# Patient Record
Sex: MALE | Race: WHITE | NOT HISPANIC OR LATINO
[De-identification: names, ages, dates, MRNs, and addresses within clinical notes are randomized per-mention and may not be internally consistent; named-entity substitution may affect disease eponyms.]

---

## 2020-09-16 PROBLEM — Z00.00 ENCOUNTER FOR PREVENTIVE HEALTH EXAMINATION: Status: ACTIVE | Noted: 2020-09-16

## 2020-09-17 ENCOUNTER — APPOINTMENT (OUTPATIENT)
Dept: ORTHOPEDIC SURGERY | Facility: CLINIC | Age: 65
End: 2020-09-17
Payer: MEDICARE

## 2020-09-17 VITALS — HEIGHT: 67 IN | BODY MASS INDEX: 30.13 KG/M2 | WEIGHT: 192 LBS

## 2020-09-17 VITALS — SYSTOLIC BLOOD PRESSURE: 150 MMHG | HEART RATE: 75 BPM | DIASTOLIC BLOOD PRESSURE: 82 MMHG

## 2020-09-17 VITALS — TEMPERATURE: 97.4 F

## 2020-09-17 DIAGNOSIS — Z72.89 OTHER PROBLEMS RELATED TO LIFESTYLE: ICD-10-CM

## 2020-09-17 DIAGNOSIS — Z78.9 OTHER SPECIFIED HEALTH STATUS: ICD-10-CM

## 2020-09-17 DIAGNOSIS — Z86.39 PERSONAL HISTORY OF OTHER ENDOCRINE, NUTRITIONAL AND METABOLIC DISEASE: ICD-10-CM

## 2020-09-17 DIAGNOSIS — Z86.79 PERSONAL HISTORY OF OTHER DISEASES OF THE CIRCULATORY SYSTEM: ICD-10-CM

## 2020-09-17 PROCEDURE — 73030 X-RAY EXAM OF SHOULDER: CPT | Mod: RT

## 2020-09-17 PROCEDURE — 99204 OFFICE O/P NEW MOD 45 MIN: CPT | Mod: 25

## 2020-09-17 PROCEDURE — 20611 DRAIN/INJ JOINT/BURSA W/US: CPT | Mod: RT

## 2020-09-18 NOTE — DISCUSSION/SUMMARY
[de-identified] : Patient was seen today for evaluation and management of right shoulder pain.  The patient was diagnosed with rotator cuff calcific tendinitis and after discussing various treatment options including all risks/benefits/alternatives, patient elected to enroll in the Clinical Trial Titled - The Use of Extended Release Triamcinolone in the Treatment of Rotator Cuff Disease\par IND/TAISHA Number:  [id number]\par HRPP/IRB Number:  \par The patient, now referred to as the subject, is being enrolled into the above named study.  The subject meets inclusion criteria and does not meet any of the exclusion criteria.  Informed consent was obtained prior to any study procedures being performed and the subject was given an opportunity to ask questions which were answered completely.  The subject was given a copy of the signed consent form and elected to proceed with the procedure (see procedure note).\par The subject is aware that the only medication they should be taking for pain is OTC Tylenol (acetaminophen) as needed for pain.  They are to abstain from all OTC/Prescription oral NSAIDs (Advil, Motrin, ibuprofen, Aleve, etc.) and if they do take oral NSAIDs it would be considered a study deviation and they should notify me immediately.  \par The subject is aware that they will be receiving electronic surveys at 2 weeks and 8 weeks after today's visit and they are required to fill out these surveys to the best of their ability.  They are also aware they are to have follow-up office visits at 4 weeks (+/- 7 days), 12 weeks (+/- 7 days), and 24 weeks (+/- 7 days) after today's visit.\par Patient will be started on a course of physical therapy to restore normal range of motion and strength as tolerated.\par Patient appreciates and agrees with current plan.\par \par This note was generated using dragon medical dictation software.  A reasonable effort has been made for proofreading its contents, but typos may still remain.  If there are any questions or points of clarification needed please notify my office.\par

## 2020-09-18 NOTE — PHYSICAL EXAM
[de-identified] : Constitutional: Well-nourished, well-developed, No acute distress\par Respiratory:  Good respiratory effort, no SOB\par Lymphatic: No regional lymphadenopathy, no lymphedema\par Psychiatric: Pleasant and normal affect, alert and oriented x3\par Skin: Clean dry and intact B/L UE/LE\par Musculoskeletal: normal except where as noted in regional exam\par \par \par Left Shoulder:\par APPEARANCE: no marked deformities, no swelling or malalignment\par POSITIVE TENDERNESS: none\par NONTENDER: supraspinatus, infraspinatus, teres minor, LH biceps, anterior and posterior capsule, AC joint\par ROM: full & painless, no scapular winging or dyskinesia present\par RESISTIVE TESTING: painless 5/5 resisted flex/ext, empty can/ER/IR, horizontal abd/add \par SPECIAL TESTS: neg Drop Arm, neg Empty Can, neg Al/Neers, neg Anthony's, neg Speeds, neg Apprehension, neg cross arm adduction, neg apley's scratch test\par Vasc: 2+ radial pulse\par Neuro: AIN, PIN, Ulnar nerve intact to motor, DTRs 2+/4 biceps, triceps, brachioradialis\par Sensation: Intact to light touch throughout\par B/L Elbows:  No asymmetry, malalignment, or swelling, Full ROM, 5/5 strength in flexion/ext, pronation/supination, Joints stable\par B/L Wrist and Hand:  No asymmetry, malalignment, or swelling, Full ROM, 5/5 strength in wrist and long finger flexion/ext, radial/ulnar deviation, Joints stable\par \par Right Shoulder:\par APPEARANCE: no marked deformities, no swelling or malalignment\par POSITIVE TENDERNESS: supraspinatus, long head biceps tendon\par NONTENDER:  infraspinatus, teres minor. biceps. anterior and posterior capsule. AC joint. \par ROM: full PROM, Active ROM flexion limited to 90 deg, abduction limited to 90 deg, moderate painful arc past 60 degrees, no scapular winging or dyskinesia present\par RESISTIVE TESTING: MMT 4+/5 ER, Flexion and Empty can, 5/5 IR. painless 5/5 resisted ext, horizontal abd/add \par SPECIAL TESTS: + Al and Neers, mildly + cross arm adduction, + Speeds, neg Anthony's, neg Drop Arm, neg Apprehension. neg apley's scratch test\par Vasc: 2+ radial pulse\par Neuro: AIN, PIN, Ulnar nerve intact to motor, DTRs 2+/4 biceps, triceps, brachioradialis\par Sensation: Intact to light touch throughout\par  [de-identified] : The following radiographs were ordered and read by me during this patient's visit. I reviewed each radiograph in detail with the patient and discussed the findings as highlighted below. \par \par 3 views of the right shoulder were obtained today that show degenerative changes and evidence of mild GH osteoarthritis with significant large calcific deposit seen overlying the superolateral humeral head.  No fracture, or dislocation seen at this time. There is no malalignment. No other obvious osseous abnormality. Otherwise unremarkable.\par \par

## 2020-09-18 NOTE — PROCEDURE
[de-identified] : Ultrasound Guided Injection \par Indication for ultrasound guidance: Ensure placement within the pathological portion of the rotator cuff/subacromial impingement\par \par Utlizing the [employer] Edge portable ultrasound machine, the Linear 6cm 13-6 MHz transducer, sterile probe cover and sterile ultrasound gel, ultrasound guidance with the probe in the transverse axis overlying the superolateral humeral head, utilizing an in plane approach, was used for the following injection:\par \par Injection: Right shoulder Subacromial Space.\par Indication: Impingement rotator cuff /calcific tendinitis \par \par A discussion was had with the patient regarding this procedure and all questions were answered. All risks, benefits and alternatives were discussed. These included but were not limited to bleeding, infection, and allergic reaction.  A timeout was done to ensure correct side and pt agreed to the procedure.   Alcohol was used to clean the skin, and betadine was used to sterilize and prep the area in the posterior aspect of the shoulder. Ethyl chloride spray was then used as a topical anesthetic.  Following vendor guidelines and sterile technique 5cc of Zilretta injectable solution was prepared in a 10cc syringe.  A 22-gauge needle was used to inject 5cc of Zilretta solution and an image confirming location of injection was saved. A sterile bandage was then applied. The patient tolerated the procedure well.\par \par Internal Study Number: [id number]\par Kit No: [id number]\par Exp Date: Nov 2020\par \par \par

## 2020-09-18 NOTE — HISTORY OF PRESENT ILLNESS
[de-identified] : RHD Patient is here for right shoulder pain that began about 2 weeks ago without inciting injury. He had a prior injury 5-6 years ago that was treated with PT. the pain has progressively worsened. He has increased pain when lifting his arm above his head. He has some pain that radiates to his elbow. He is experiencing N/T into his 4th and 5th digits when he leans on his elbow. He takes Advil and uses a heating pad for pain relief. He is not currently working or exercising. Denies prior injury. \par \par The patient's past medical history, past surgical history, medications and allergies were reviewed by me today and documented accordingly. In addition, the patient's family and social history, which were noncontributory to this visit, were reviewed also. Intake form was reviewed. The patient has no family history of arthritis.

## 2020-10-01 ENCOUNTER — TRANSCRIPTION ENCOUNTER (OUTPATIENT)
Age: 65
End: 2020-10-01

## 2020-10-15 ENCOUNTER — APPOINTMENT (OUTPATIENT)
Dept: ORTHOPEDIC SURGERY | Facility: CLINIC | Age: 65
End: 2020-10-15
Payer: SUBSIDIZED

## 2020-10-15 VITALS — TEMPERATURE: 97.2 F

## 2020-10-15 PROCEDURE — ZZZZZ: CPT

## 2020-10-16 NOTE — DISCUSSION/SUMMARY
[de-identified] : Patient was seen today for continued monitoring within the clinical Trial Titled - The Use of Extended Release Triamcinolone in the Treatment of Rotator Cuff Disease.  Patient has had no adverse events thus far, he has significant reduction in pain, we will continue through the study with monitoring visits as previously scheduled.  Patient appreciates and agrees with current plan.\par \par This note was generated using dragon medical dictation software.  A reasonable effort has been made for proofreading its contents, but typos may still remain.  If there are any questions or points of clarification needed please notify my office.

## 2020-10-16 NOTE — PHYSICAL EXAM
[de-identified] : Constitutional: Well-nourished, well-developed, No acute distress\par Respiratory:  Good respiratory effort, no SOB\par Lymphatic: No regional lymphadenopathy, no lymphedema\par Psychiatric: Pleasant and normal affect, alert and oriented x3\par Skin: Clean dry and intact B/L UE/LE\par Musculoskeletal: normal except where as noted in regional exam\par \par Right shoulder:\par APPEARANCE: no marked deformities, no swelling or malalignment\par POSITIVE TENDERNESS: none\par NONTENDER: supraspinatus, infraspinatus, teres minor, LH biceps, anterior and posterior capsule, AC joint \par ROM: full & painless, no scapular winging or dyskinesia present\par RESISTIVE TESTING: painless 5/5 resisted flex/ext, empty can/ER/IR, horizontal abd/add \par SPECIAL TESTS: neg Drop Arm, neg Empty Can, neg Al/Neers, neg Anthony's, neg Speeds, neg Apprehension, neg cross arm adduction, neg apley's scratch test\par Vasc: 2+ radial pulse\par Neuro: AIN, PIN, Ulnar nerve intact to motor\par Sensation: Intact to light touch throughout\par

## 2020-10-16 NOTE — HISTORY OF PRESENT ILLNESS
[de-identified] : Patient is here for right shoulder injection study follow up.  Patient has had significant relief of his shoulder pain from the cortisone injection provided, he states he is pain-free at this time.  He is undergoing his course of physical therapy as recommended and is performing ADLs and other activities without limitation.

## 2020-12-31 ENCOUNTER — APPOINTMENT (OUTPATIENT)
Dept: ORTHOPEDIC SURGERY | Facility: CLINIC | Age: 65
End: 2020-12-31
Payer: SUBSIDIZED

## 2020-12-31 VITALS — TEMPERATURE: 96.4 F

## 2020-12-31 VITALS — HEIGHT: 67 IN | BODY MASS INDEX: 30.13 KG/M2 | WEIGHT: 192 LBS

## 2020-12-31 PROCEDURE — 99024 POSTOP FOLLOW-UP VISIT: CPT

## 2021-01-02 NOTE — PHYSICAL EXAM
[de-identified] : Constitutional: Well-nourished, well-developed, No acute distress\par Respiratory:  Good respiratory effort, no SOB\par Lymphatic: No regional lymphadenopathy, no lymphedema\par Psychiatric: Pleasant and normal affect, alert and oriented x3\par Skin: Clean dry and intact B/L UE/LE\par Musculoskeletal: normal except where as noted in regional exam\par \par Right shoulder:\par APPEARANCE: no marked deformities, no swelling or malalignment\par POSITIVE TENDERNESS: none\par NONTENDER: supraspinatus, infraspinatus, teres minor, LH biceps, anterior and posterior capsule, AC joint \par ROM: full & painless, no scapular winging or dyskinesia present\par RESISTIVE TESTING: painless 5/5 resisted flex/ext, empty can/ER/IR, horizontal abd/add \par SPECIAL TESTS: neg Drop Arm, neg Empty Can, neg Al/Neers, neg Anthony's, neg Speeds, neg Apprehension, neg cross arm adduction, neg apley's scratch test\par Vasc: 2+ radial pulse\par Neuro: AIN, PIN, Ulnar nerve intact to motor\par Sensation: Intact to light touch throughout\par

## 2021-01-02 NOTE — DISCUSSION/SUMMARY
[de-identified] : Patient was seen today for continued monitoring within the clinical Trial Titled - The Use of Extended Release Triamcinolone in the Treatment of Rotator Cuff Disease.  Patient has had no adverse events thus far, he has significant reduction in pain, we will continue through the study with monitoring visits as previously scheduled.  Patient appreciates and agrees with current plan.\par \par This note was generated using dragon medical dictation software.  A reasonable effort has been made for proofreading its contents, but typos may still remain.  If there are any questions or points of clarification needed please notify my office.

## 2021-01-02 NOTE — HISTORY OF PRESENT ILLNESS
[de-identified] : Patient is seen today for scheduled follow-up within the clinical trial.  Patient feels like he has significant improvement in his pain, he had an episode 2 nights ago where he had a few minutes of pain after leaning against a sofa, but that went away after a few minutes.  He has been having no pain since that time.  No pain today.ls completely normal.

## 2021-04-08 ENCOUNTER — APPOINTMENT (OUTPATIENT)
Dept: ORTHOPEDIC SURGERY | Facility: CLINIC | Age: 66
End: 2021-04-08
Payer: MEDICARE

## 2021-04-08 VITALS
DIASTOLIC BLOOD PRESSURE: 74 MMHG | HEART RATE: 87 BPM | WEIGHT: 192 LBS | SYSTOLIC BLOOD PRESSURE: 145 MMHG | HEIGHT: 67 IN | BODY MASS INDEX: 30.13 KG/M2

## 2021-04-08 PROCEDURE — 20610 DRAIN/INJ JOINT/BURSA W/O US: CPT | Mod: RT

## 2021-04-08 PROCEDURE — 99213 OFFICE O/P EST LOW 20 MIN: CPT | Mod: 25

## 2021-04-08 PROCEDURE — 99072 ADDL SUPL MATRL&STAF TM PHE: CPT

## 2021-04-08 NOTE — PHYSICAL EXAM
[de-identified] : Constitutional: Well-nourished, well-developed, No acute distress\par Respiratory:  Good respiratory effort, no SOB\par Lymphatic: No regional lymphadenopathy, no lymphedema\par Psychiatric: Pleasant and normal affect, alert and oriented x3\par Skin: Clean dry and intact B/L UE/LE\par Musculoskeletal: normal except where as noted in regional exam\par \par Right shoulder:\par APPEARANCE: no marked deformities, no swelling or malalignment\par POSITIVE TENDERNESS: supraspinatus\par NONTENDER:  infraspinatus, teres minor. biceps. anterior and posterior capsule. AC joint. \par ROM: full with mild painful arc past 60 degrees, no scapular winging or dyskinesia present\par RESISTIVE TESTING: MMT 4+/5 ER and empty can, 5/5 IR. painless 5/5 resisted flex/ext, horizontal abd/add \par SPECIAL TESTS: + Al and Neers, mildly + cross arm adduction, neg Speeds, neg Anthony's, neg Drop Arm, neg Apprehension. neg apley's scratch test\par

## 2021-04-08 NOTE — PROCEDURE
[de-identified] : Injection: Right  Shoulder Subacromial Space.\par Indication: Impingement.\par \par A discussion was had with the patient regarding this procedure and all questions were answered. All risks, benefits and alternatives were discussed. These included but were not limited to bleeding, infection, and allergic reaction.  A timeout was done to ensure correct side and pt agreed to the procedure.   Alcohol was used to clean the skin, and betadine was used to sterilize and prep the area in the posterior aspect of the shoulder. Ethyl chloride spray was then used as a topical anesthetic. A 22-gauge 1.5" needle was used to inject 2cc of 0.25% bupivacaine and 1cc of 40mg/ml methylprednisolone into the subacromial space. A sterile bandage was then applied. The patient tolerated the procedure well and there were no complications.

## 2021-04-08 NOTE — DISCUSSION/SUMMARY
[de-identified] : Patient was seen today for reassessment of right shoulder pain secondary to exacerbation of underlying calcific tendinitis.  Patient had very good long-lasting relief from long-acting triamcinolone injection that he received during the FDA trial.  Today is the last scheduled visit of that trial, and patient has had recurrence of pain without recent trauma over the last 1 month.  I advised the patient this is a good response to cortisone injection.  We discussed various treatment options as well as associated risk/benefits/alternatives and patient elected to proceed with repeat subacromial cortisone injection (Depo-Medrol) today (see procedure note).  Informed the patient that the numbing medicine in today's injection will last for about 4-6 hours. The steroid that was injected will start to work in 1 to 2 days, peak at 1-2 weeks, and may last up to 1-2 months.  Patient will be restarted on his course of physical therapy, and he may resume other ADLs and other activities as tolerated.  If patient has short lasting relief from this cortisone injection, or he has recurrence of pain again may consider MRI at that time for further evaluation of the integrity of the rotator cuff.  Follow up as needed.  Patient appreciates and agrees with current plan.\par \par This note was generated using dragon medical dictation software.  A reasonable effort has been made for proofreading its contents, but typos may still remain.  If there are any questions or points of clarification needed please notify my office.\par

## 2021-05-06 ENCOUNTER — APPOINTMENT (OUTPATIENT)
Dept: ORTHOPEDIC SURGERY | Facility: CLINIC | Age: 66
End: 2021-05-06
Payer: MEDICARE

## 2021-05-06 PROCEDURE — 99072 ADDL SUPL MATRL&STAF TM PHE: CPT

## 2021-05-06 PROCEDURE — 99213 OFFICE O/P EST LOW 20 MIN: CPT

## 2021-05-06 NOTE — DISCUSSION/SUMMARY
[de-identified] : Patient was seen today for reevaluation of chronic right shoulder pain secondary to calcific rotator cuff pathology.  Patient had good relief from first cortisone injection, however he has had less relief from recent cortisone injection.  He is having actual worsening of his pain with more frequent daily pain and more pain with overhead activity.  Due to persistence/worsening of rotator cuff pain recommend MRI of the right shoulder to evaluate for extent of rotator cuff pathology.  Patient is advised we can review MRI results over the phone once available.  If he has significant rotator cuff tear findings would recommend surgical consultation at that time.  Patient appreciates and agrees with current plan.\par \par This note was generated using dragon medical dictation software.  A reasonable effort has been made for proofreading its contents, but typos may still remain.  If there are any questions or points of clarification needed please notify my office.

## 2021-05-06 NOTE — PHYSICAL EXAM
[de-identified] : Constitutional: Well-nourished, well-developed, No acute distress\par Respiratory:  Good respiratory effort, no SOB\par Lymphatic: No regional lymphadenopathy, no lymphedema\par Psychiatric: Pleasant and normal affect, alert and oriented x3\par Musculoskeletal: normal except where as noted in regional exam\par \par Right shoulder:\par APPEARANCE: no marked deformities, no swelling or malalignment\par POSITIVE TENDERNESS: supraspinatus\par NONTENDER:  infraspinatus, teres minor. biceps. anterior and posterior capsule. AC joint. \par ROM: full with moderate painful arc past 60 degrees, no scapular winging or dyskinesia present\par RESISTIVE TESTING: MMT 4+/5 ER and empty can, 5/5 IR. painless 5/5 resisted flex/ext, horizontal abd/add \par SPECIAL TESTS: + Al and Neers, mildly + cross arm adduction, neg Speeds, neg Anthony's, neg Drop Arm, neg Apprehension. neg apley's scratch test\par

## 2021-05-06 NOTE — HISTORY OF PRESENT ILLNESS
[de-identified] : Patient is here for right shoulder pain follow up. He has not had much relief from the last injection and feels that his pain is worsening. There has been no recent injury.

## 2021-06-01 ENCOUNTER — APPOINTMENT (OUTPATIENT)
Dept: ORTHOPEDIC SURGERY | Facility: CLINIC | Age: 66
End: 2021-06-01
Payer: MEDICARE

## 2021-06-01 PROCEDURE — 99212 OFFICE O/P EST SF 10 MIN: CPT | Mod: 95

## 2021-06-01 NOTE — HISTORY OF PRESENT ILLNESS
[Home] : at home, [unfilled] , at the time of the visit. [Medical Office: ([facility])___] : at the medical office located in  [Verbal consent obtained from patient] : the patient, [unfilled] [de-identified] : Patient was seen today via telehealth for review of MRI results.  Patient was able to obtain MRI of his right shoulder and would like to review his results at this time.  Patient has been having persisting pain since last evaluation, he feels his pain is actually worse at this time, he is having pain that wakes him up at night, and pain with any overhead activity.

## 2021-06-01 NOTE — DISCUSSION/SUMMARY
[de-identified] : Patient was seen today for reevaluation of persisting right shoulder pain secondary to underlying calcific tendinitis.  We reviewed his MRI results which shows the presence of the calcium deposit within the rotator cuff tendons, and surrounding rotator cuff tendinosis with mild fraying but no significant high-grade full-thickness tears.  With persisting/worsening shoulder pain due to this underlying pathology recommend surgical consultation.  They can discuss risk/benefits of repeat cortisone injection, versus referral to IR for calcium aspiration, versus arthroscopic rotator cuff debridement.  Patient appreciates and agrees with current plan.\par \par This note was generated using dragon medical dictation software.  A reasonable effort has been made for proofreading its contents, but typos may still remain.  If there are any questions or points of clarification needed please notify my office.

## 2021-06-01 NOTE — PHYSICAL EXAM
[de-identified] : Constitutional: Well-nourished, well-developed, No acute distress\par Respiratory:  Good respiratory effort, no SOB\par Psychiatric: Pleasant and normal affect, alert and oriented x3\par \par Right shoulder:\par APPEARANCE: no marked deformities, no swelling or malalignment\par ROM: full with moderate painful arc past 60 degrees, no scapular winging or dyskinesia present\par  [de-identified] : I reviewed, interpreted and clinically correlated the following outside imaging studies,\par \par MRI right shoulder from [facility] radiology 5/18/2021\par Calcific tendinosis of the supraspinatus tendon, rotator cuff tendinosis with very low-grade tearing of the subscapularis tendon, no high-grade rotator cuff tear is seen, mild subacromial bursitis.

## 2021-06-07 ENCOUNTER — APPOINTMENT (OUTPATIENT)
Dept: ORTHOPEDIC SURGERY | Facility: CLINIC | Age: 66
End: 2021-06-07
Payer: MEDICARE

## 2021-06-07 PROCEDURE — 99072 ADDL SUPL MATRL&STAF TM PHE: CPT

## 2021-06-07 PROCEDURE — 99203 OFFICE O/P NEW LOW 30 MIN: CPT

## 2021-06-08 NOTE — DISCUSSION/SUMMARY
[de-identified] : 67 y/o male with right shoulder rotator cuff tendinopathy\par \par The patient presents today with chronic onset of right shoulder pain consistent with a clinical diagnosis of calcific tendinopathy. Radiographs show a calcification at the rotator cuff insertion and MRI suggests degenerative rotator cuff pathology in the setting of calcific tendinopathy. Clinically, the patient specific point tenderness to this location as well as positive impingement signs. I discussed the pathophysiology of the diagnosis and the distinction between the resorptive and formative phase.\par \par I discussed the treatment of calcific tendinitis with the patient including nonoperative management as first line treatment. Anti-inflammatory medications (NSAID's) with physical therapy for strengthening and conditioning of the joint to preserve shoulder range of motion is typically required. Corticosteroid injection may be indicated for refractory cases and for acute symptomatic pain relief. \par \par Given failure of conservative management, recommendation at this time would be for arthroscopic debridement with possible rotator cuff repair.  All risks, benefits and alternatives to the proposed surgical procedure, as well as the need for formal post-operative rehabilitation were discussed in great detail with the patient. Risks include but are not limited to pain, bleeding, infection, neurovascular injury, stiffness, rotator cuff repair, medical complications (including DVT, PE, MI), and risks of anesthesia. \par \par A discussion was also had with the patient regarding additional risk given recent Covid-19 pandemic; including the potential additional risk of anesthesia and any medical comorbidities that the patient has.  It was made clear to the patient that we are taking all precautions regarding Covid-19 with regards to surgical scheduling and perioperative care.  The patient understands that current protocol requires Covid-19 testing no more than 48hrs prior to surgery, and PST's may be performed onsite at the day of surgery. \par \par The patient expressed understanding and all questions were answered. The patient is electing to proceed, and will have the patient scheduled accordingly.

## 2021-06-08 NOTE — PHYSICAL EXAM
[de-identified] : Oriented to time, place, person\par Mood: Normal\par Affect: Normal\par Appearance: Healthy, well appearing, no acute distress.\par Gait: Normal\par Assistive Devices: None\par \par Right shoulder exam:\par \par Inspection: No malalignment, No defects, No atrophy\par Skin: No masses, No lesions\par Neck: Negative Spurling, full ROM, no pain with ROM\par AROM: FF to 180, abduction to 90, ER to 60, IR to mid thoracic\par Painful arc ROM: Pain with terminal ROM\par Tenderness: No bicipital tenderness, + tenderness to greater tuberosity/RTC insertion, +anterior shoulder/lesser tuberosity tenderness\par Strength: 4+/5 ER, 5/5 IR in adduction, 4/5 supraspinatus testing, negative [place]'s test\par AC joint: No TTP/pain with cross arm testing\par Biceps: Speed Negative, Yergason Negative \par Impingement test: +Al, +Neer\par Vasc: 2+ radial pulse \par Stability: Stable \par Neuro: AIN, PIN, Ulnar nerve intact to motor\par Sensation: Intact to light touch throughout  [de-identified] : Images were reviewed from [facility] dated 9.17.20. \par \par Multiple images right shoulder showed no evidence of bony injury, or lucille dislocation. There is no underlying degenerative arthritic change seen. Overall alignment is maintained.  Evidence of rotator cuff tendinopathy. \par \par MRI right shoulder dated 5/18/2021 shows calcific tendinosis of the supraspinatus tendon. RTC tendinosis with low-grade tearing of the subscapularis tendon.

## 2021-06-08 NOTE — HISTORY OF PRESENT ILLNESS
[de-identified] : 66 year old male presents today for evaluation of right shoulder pain since 9/2020. No injury reported. He has been under the care of Dr. Zimmerman since 9/2020, has received two injections; first injection was 9/2020 of zilretta which gave him relief for 6 months. Second cortisone injection was 4/2021 which was not helpful. He obtained MRI right shoulder from [facility] radiology 5/18/2021 that showed calcific tendinosis of the RTC. The pain is constant ache and gets worse with internal, when bringing his arm down from overhead, rotation and shaking hands. Takes Advil/Ibuprofen for pain.\par \par The patient's past medical history, past surgical history, medications and allergies were reviewed by me today with the patient and documented accordingly. In addition, the patient's family and social history, which were noncontributory to this visit, were reviewed also.

## 2021-06-10 ENCOUNTER — OUTPATIENT (OUTPATIENT)
Dept: OUTPATIENT SERVICES | Facility: HOSPITAL | Age: 66
LOS: 1 days | End: 2021-06-10
Payer: MEDICARE

## 2021-06-10 VITALS
DIASTOLIC BLOOD PRESSURE: 70 MMHG | HEART RATE: 96 BPM | HEIGHT: 60 IN | TEMPERATURE: 98 F | RESPIRATION RATE: 16 BRPM | WEIGHT: 190.92 LBS | OXYGEN SATURATION: 95 % | SYSTOLIC BLOOD PRESSURE: 120 MMHG

## 2021-06-10 DIAGNOSIS — Z98.890 OTHER SPECIFIED POSTPROCEDURAL STATES: Chronic | ICD-10-CM

## 2021-06-10 DIAGNOSIS — M75.111 INCOMPLETE ROTATOR CUFF TEAR OR RUPTURE OF RIGHT SHOULDER, NOT SPECIFIED AS TRAUMATIC: ICD-10-CM

## 2021-06-10 DIAGNOSIS — M75.41 IMPINGEMENT SYNDROME OF RIGHT SHOULDER: ICD-10-CM

## 2021-06-10 DIAGNOSIS — M75.31 CALCIFIC TENDINITIS OF RIGHT SHOULDER: ICD-10-CM

## 2021-06-10 DIAGNOSIS — M75.110 INCOMPLETE ROTATOR CUFF TEAR OR RUPTURE OF UNSPECIFIED SHOULDER, NOT SPECIFIED AS TRAUMATIC: ICD-10-CM

## 2021-06-10 LAB
ANION GAP SERPL CALC-SCNC: 16 MMOL/L — HIGH (ref 7–14)
BUN SERPL-MCNC: 28 MG/DL — HIGH (ref 7–23)
CALCIUM SERPL-MCNC: 9.8 MG/DL — SIGNIFICANT CHANGE UP (ref 8.4–10.5)
CHLORIDE SERPL-SCNC: 103 MMOL/L — SIGNIFICANT CHANGE UP (ref 98–107)
CO2 SERPL-SCNC: 21 MMOL/L — LOW (ref 22–31)
CREAT SERPL-MCNC: 1.39 MG/DL — HIGH (ref 0.5–1.3)
GLUCOSE SERPL-MCNC: 96 MG/DL — SIGNIFICANT CHANGE UP (ref 70–99)
HCT VFR BLD CALC: 49.2 % — SIGNIFICANT CHANGE UP (ref 39–50)
HGB BLD-MCNC: 16.5 G/DL — SIGNIFICANT CHANGE UP (ref 13–17)
MCHC RBC-ENTMCNC: 28.6 PG — SIGNIFICANT CHANGE UP (ref 27–34)
MCHC RBC-ENTMCNC: 33.5 GM/DL — SIGNIFICANT CHANGE UP (ref 32–36)
MCV RBC AUTO: 85.4 FL — SIGNIFICANT CHANGE UP (ref 80–100)
NRBC # BLD: 0 /100 WBCS — SIGNIFICANT CHANGE UP
NRBC # FLD: 0 K/UL — SIGNIFICANT CHANGE UP
PLATELET # BLD AUTO: 174 K/UL — SIGNIFICANT CHANGE UP (ref 150–400)
POTASSIUM SERPL-MCNC: 3.5 MMOL/L — SIGNIFICANT CHANGE UP (ref 3.5–5.3)
POTASSIUM SERPL-SCNC: 3.5 MMOL/L — SIGNIFICANT CHANGE UP (ref 3.5–5.3)
RBC # BLD: 5.76 M/UL — SIGNIFICANT CHANGE UP (ref 4.2–5.8)
RBC # FLD: 13.2 % — SIGNIFICANT CHANGE UP (ref 10.3–14.5)
SODIUM SERPL-SCNC: 140 MMOL/L — SIGNIFICANT CHANGE UP (ref 135–145)
WBC # BLD: 8.01 K/UL — SIGNIFICANT CHANGE UP (ref 3.8–10.5)
WBC # FLD AUTO: 8.01 K/UL — SIGNIFICANT CHANGE UP (ref 3.8–10.5)

## 2021-06-10 PROCEDURE — 93010 ELECTROCARDIOGRAM REPORT: CPT

## 2021-06-10 NOTE — H&P PST ADULT - MUSCULOSKELETAL COMMENTS
right shoulder when lifting arm forward and bringing back down to rest. Pre-op diagnosis incomplete rotator cuff tear

## 2021-06-10 NOTE — H&P PST ADULT - HEMATOLOGY/LYMPHATICS
Keyur Pal is an 60 year old male being seen at the request of Dr. Weeks.  Patient was diagnosed with common variable immunodeficiency in 1981 while giving blood transfusions. He was followed for many years but started IVIG replacement in July/August 2015. This was when he had a cavitating pulmonary nodule found be Klebsiella. In 2012 he had had an MRSA infection. He has been receiving 45 g of IVIG every 28 days. In the past few years his spleen has been progressively enlarging, but seems to of really taken off in the past year. He denies f/c/s. His biggest issues are mass related. He has 4-5 bowel movements a day. He has early satiety and has lost about 4-5 pounds in the last year. He has begun to have left sided back pain/ache.    His CTs have shown the splenomegaly, but no clear adenopathy or other mass lesions.   A BMBx of 4/23/19 was interpreted as follows:  Bone marrow, biopsy core, touch imprint, aspirate and peripheral blood smears    ([id number], 4/23/19) (Consultant's Diagnosis, [facility]):- Cellular    (60%) bone marrow with trilineage hematopoiesis.    - Multiple benign lymphoid aggregates.    - Thrombocytopenia.    - See Comment.        Diagnosis Comment:    This is a case of a 60-year-old male with common variable immuno deficiency    (CVID).  He is being evaluated for massive splenomegaly.  Examination of the    peripheral blood reveals thrombocytopenia.  The bone marrow is mildly    hypercellular for age with trilineage hematopoiesis and maturation.  There are    several atypical, including paratrabecular, lymphoid aggregates present within    the bone marrow which are B-cell rich.  The findings of these atypical    lymphoid aggregates is concerning for a low-grade B-cell lymphoma.  PCR gene    rearrangement studies are currently pending.  Dr. Aurelia Daniel has reviewed    this case and concurs with this assessment.      Past Medical History:   Diagnosis Date   • Common variable  immunodeficiency (CMS/HCC)     a subsection of his white blood cells, worsened with age   • Fracture     nose    • MRSA (methicillin resistant Staphylococcus aureus) 2012   • Pneumonia 2011    has had several due to immunodeficiency   • Wears partial dentures     upper     Past Surgical History:   Procedure Laterality Date   • Bronchoscopy     • Service to gastroenterology      Colonoscopy   • Tonsillectomy and adenoidectomy      age 16     Family History   Problem Relation Age of Onset   • Hypertension Mother    • Melanoma Mother    • Cancer Father 84        prostate   • Dementia/Alzheimers Father    • Melanoma Sister    • Diabetes Maternal Grandfather      Social History     Tobacco Use   • Smoking status: Current Some Day Smoker     Packs/day: 0.25     Years: 30.00     Pack years: 7.50     Types: Cigarettes     Last attempt to quit: 3/17/2017     Years since quittin.2   • Smokeless tobacco: Never Used   • Tobacco comment: Has tried chantix   Substance Use Topics   • Alcohol use: Yes     Alcohol/week: 0.6 oz     Types: 1 Glasses of wine per week       Prior to Admission Meds:  (Not in a hospital admission)  Scheduled Meds:  Continuous Infusions:  PRN Meds:    Allergies: ALLERGIES:  No Known Allergies    Active Problems:    * No active hospital problems. *    There were no vitals taken for this visit.    Review of Systems  Obtained by patient intake form and entered by the medical assistant/physician assistant. (see MA/PA notes dated same day as appointment). I have reviewed the pertinent positives and negatives with the patient and agree with documentation entered.     Physical Exam  Visit Vitals  /66   Pulse 80   Temp 97.5 °F (36.4 °C) (Oral)   Ht 6' (1.829 m)   Wt 77.3 kg   BMI 23.11 kg/m²     No apparent distress.  Pupils equal, round, reactive to light and accommodation, extraocular movements intact.   Neck:  Supple, no lymphadenopathy, no masses or lesions.  Heart:  Regular rate,  rhythm.   Lungs:  Clear to auscultation bilaterally, normal respirations.  Abdomen:  Soft, nontender, nondistended, no masses or lesions.  Very large spleen - crosses midline and enters into lower pelvis  Extremities:  No clubbing, cyanosis, edema.  Neurologic:  Awake, alert.  Lymph Nodes:  No cervical, no axillary, no supraclavicular lymphadenopathy.      Assessment:  Mr. Pal is a pleasant 60-year-old gentleman accompanied by his wife referred to us for history of CV ID. He is being followed by Hematology in view of massive splenomegaly with the question of an underlying contribution of his immunodeficiency to the same.    His hematologist discussed with him possible differential scenario in which a condition such as CVID could be associated with increase in spleen size. This could be part of a 'syndromic' immunodeficiency presentation such as ALPS but would be unlikely since most of these present early on in childhood and his issues started relatively recently.     Blood work was done and did not show lymphoproliferative non-malignant conditions associated with immune deficiency, looking a B cell markers, and cell survival.     Consideration for reactive splenomegaly in association with CVID is likely contributing with the final possibility of a low grade hematological low-grade lymphoproliferative disorder akin to marginal zone lymphoma with severe splenomegaly as noted by Hematology team. Mr. Pal is affected by his large spleen, abdominal pain, dull ache and back pain daily. He is also at additional risk of splenic rupture with trauma, as well as consequences of hypersplenism (he continues to have low platelet counts) and has noticed an minor increase in bleeding tendency but none severe.      Plan:  Immunizations - dead viruses, Dr Weeks agrees that patient should have this done preoperatively.   Plan for hand assisted splenectomy  Risks of surgery including bleeding, infection, post splenectomy sepsis,  conversion to open and pancreatitis were discussed.     In addendum, I received message 6/27:  Patient talked to Dr. Weeks yesterday the immunologist at [facility] called Nestor back and agrees with Dr. Torrez that his spleen should not be removed.   Nestor states he is really confused right now, but he wanted me to let Dr. Weeks know what the immunologist said.     I called patient on 6/28 and discussed above.  As he was uncertain now that he wanted splenectomy.  I said this is a big decision.  He could try the rituxan and if no response, then consider surgery.  We discussed postponing surgery until he further decided which path he wanted.  Patient kept changing his mind in clinic and on the phone.    I felt it was best to postpone surgery until he was 100% sure of surgery given its risks.   He was encouraged to call me if he changes his mind and my clinic could give him immunizations if he wanted surgery.     Icalled the Ivory Loyola MD  6/25/2019   details…

## 2021-06-10 NOTE — H&P PST ADULT - NSANTHOSAYNRD_GEN_A_CORE
No. ARIAN screening performed.  STOP BANG Legend: 0-2 = LOW Risk; 3-4 = INTERMEDIATE Risk; 5-8 = HIGH Risk

## 2021-06-10 NOTE — H&P PST ADULT - ASSESSMENT
65 yo male with history of HTN, HLD presents to PST unit with pre-op diagnosis of calcific tendinitis of right shoulder, impingement syndrome or right shoulder scheduled for right shoulder major debridement, subacromial decompression, acromioplasty, rotator cuff repair with Dr. Burkett

## 2021-06-10 NOTE — H&P PST ADULT - MUSCULOSKELETAL
Right shoulder, pre-op diagnosis incomplete rotator cuff tear/decreased ROM due to pain details… detailed exam

## 2021-06-10 NOTE — H&P PST ADULT - NSICDXPASTMEDICALHX_GEN_ALL_CORE_FT
PAST MEDICAL HISTORY:  Hyperlipidemia     Hypertension     Incomplete rotator cuff tear or rupture of right shoulder, not specified as traumatic

## 2021-06-10 NOTE — H&P PST ADULT - NSICDXPROBLEM_GEN_ALL_CORE_FT
PROBLEM DIAGNOSES  Problem: Incomplete rotator cuff tear or rupture of right shoulder, not specified as traumatic  Assessment and Plan:  scheduled for right shoulder major debridement, subacromial decompression, acromioplasty, rotator cuff repair with Dr. Burkett  on 6/16/2021.  Verbal and written pre-op instructions provided to patient. Patient verbalized understanding and will call surgeons office for revised instructions if surgery is rescheduled.   Pepcid for GI prophylaxis provided.   Patient given verbal and written instruction with teach back on chlorhexidine shampoo, and the patient verbalized understanding with return demonstration.   Covid 19 vaccination card in chart   Patient will obtain medical clearance as per surgeons request-copy requested by PST NP for dvanced age.

## 2021-06-15 ENCOUNTER — TRANSCRIPTION ENCOUNTER (OUTPATIENT)
Age: 66
End: 2021-06-15

## 2021-06-15 VITALS
HEART RATE: 73 BPM | OXYGEN SATURATION: 97 % | DIASTOLIC BLOOD PRESSURE: 77 MMHG | SYSTOLIC BLOOD PRESSURE: 135 MMHG | WEIGHT: 190.92 LBS | HEIGHT: 66 IN | TEMPERATURE: 98 F | RESPIRATION RATE: 16 BRPM

## 2021-06-16 ENCOUNTER — APPOINTMENT (OUTPATIENT)
Dept: ORTHOPEDIC SURGERY | Facility: AMBULATORY SURGERY CENTER | Age: 66
End: 2021-06-16

## 2021-06-16 ENCOUNTER — OUTPATIENT (OUTPATIENT)
Dept: OUTPATIENT SERVICES | Facility: HOSPITAL | Age: 66
LOS: 1 days | Discharge: HOME | End: 2021-06-16
Payer: MEDICARE

## 2021-06-16 VITALS
OXYGEN SATURATION: 99 % | DIASTOLIC BLOOD PRESSURE: 67 MMHG | HEART RATE: 74 BPM | SYSTOLIC BLOOD PRESSURE: 121 MMHG | TEMPERATURE: 98 F | RESPIRATION RATE: 17 BRPM

## 2021-06-16 DIAGNOSIS — M75.111 INCOMPLETE ROTATOR CUFF TEAR OR RUPTURE OF RIGHT SHOULDER, NOT SPECIFIED AS TRAUMATIC: ICD-10-CM

## 2021-06-16 DIAGNOSIS — Z98.890 OTHER SPECIFIED POSTPROCEDURAL STATES: Chronic | ICD-10-CM

## 2021-06-16 PROCEDURE — 29827 SHO ARTHRS SRG RT8TR CUF RPR: CPT | Mod: RT

## 2021-06-16 PROCEDURE — 29823 SHO ARTHRS SRG XTNSV DBRDMT: CPT | Mod: RT

## 2021-06-16 PROCEDURE — 29826 SHO ARTHRS SRG DECOMPRESSION: CPT | Mod: RT

## 2021-06-16 RX ORDER — CHLORTHALIDONE 50 MG
1 TABLET ORAL
Qty: 0 | Refills: 0 | DISCHARGE

## 2021-06-16 RX ORDER — VALSARTAN 80 MG/1
1 TABLET ORAL
Qty: 0 | Refills: 0 | DISCHARGE

## 2021-06-16 RX ORDER — METOPROLOL TARTRATE 50 MG
1 TABLET ORAL
Qty: 0 | Refills: 0 | DISCHARGE

## 2021-06-16 RX ORDER — SIMVASTATIN 20 MG/1
1 TABLET, FILM COATED ORAL
Qty: 0 | Refills: 0 | DISCHARGE

## 2021-06-16 RX ORDER — DOXAZOSIN MESYLATE 4 MG
1 TABLET ORAL
Qty: 0 | Refills: 0 | DISCHARGE

## 2021-06-16 NOTE — ASU DISCHARGE PLAN (ADULT/PEDIATRIC) - PAIN MANAGEMENT
No Tylenol or percocet until after 6:30pm; No Motrin until after 8:45pm./Prescriptions electronically submitted to pharmacy from [facility]

## 2021-06-16 NOTE — ASU DISCHARGE PLAN (ADULT/PEDIATRIC) - CARE PROVIDER_API CALL
Kushal Burkett)  Orthopedics  [address]  [place] [address]  Phone: [phone]  Fax: [phone]  Follow Up Time:

## 2021-06-28 ENCOUNTER — APPOINTMENT (OUTPATIENT)
Dept: ORTHOPEDIC SURGERY | Facility: CLINIC | Age: 66
End: 2021-06-28
Payer: MEDICARE

## 2021-06-28 PROBLEM — I10 ESSENTIAL (PRIMARY) HYPERTENSION: Chronic | Status: ACTIVE | Noted: 2021-06-10

## 2021-06-28 PROBLEM — M75.111 INCOMPLETE ROTATOR CUFF TEAR OR RUPTURE OF RIGHT SHOULDER, NOT SPECIFIED AS TRAUMATIC: Chronic | Status: ACTIVE | Noted: 2021-06-10

## 2021-06-28 PROBLEM — E78.5 HYPERLIPIDEMIA, UNSPECIFIED: Chronic | Status: ACTIVE | Noted: 2021-06-10

## 2021-06-28 PROCEDURE — 99024 POSTOP FOLLOW-UP VISIT: CPT

## 2021-06-28 NOTE — HISTORY OF PRESENT ILLNESS
[Clean/Dry/Intact] : clean, dry and intact [Healed] : healed [Doing Well] : is doing well [Excellent Pain Control] : has excellent pain control [No Sign of Infection] : is showing no signs of infection [Chills] : no chills [Fever] : no fever [Nausea] : no nausea [Vomiting] : no vomiting [de-identified] : 66-year-old male status post right shoulder SAD RTCR status post calcium excision 6/16/21 [de-identified] : 66-year-old male status post right shoulder SAD RTCR status post calcium excision. He is doing well. Presents in post op brace. He is not taking pain medication. Denies post op complication. States his pain has been well controlled and improved greatly the first day after surgery, as per patient he is very pleased with his post operative state.  [de-identified] : Right shoulder exam\par  \par ·Inspection: No significant ecchymosis, no residual swelling\par ·Skin: Incisions C/D/I, no drainage, healed\par ·ROM: not tested\par ·Painful arc ROM: not tested\par ·Tenderness: Tenderness throughout the shoulder girdle\par ·Strength: Unable to test\par ·Vasc: 2+ radial pulse\par ·Neuro: AIN, PIN, Ulnar nerve intact to motor\par Sensation: Intact to light touch throughout  [de-identified] : 66-year-old male status post right shoulder SAD RTCR status post calcium excision\par \par The patient presents for the first postoperative visit following shoulder arthroscopy. All intraoperative imaging was discussed in detail with the patient including degree of injury as well as subsequent repair. Discussion was focused on the immediate postoperative period, as well as the rehabilitation required following shoulder arthroscopy. The patient voiced understanding and is agreeable to treatment plan.\par \par Recommendations:\par 1. PT as per protocol. Improve hand/wrist/elbow ROM. \par 2. Therapeutic exercises: As instructed\par 3. Meds: Wean pain medication, transition to OTC NSAID's/tylenol as tolerated\par 4. Bracing: Abduction brace x6wks, may remove for HEP/PT/Hygiene\par \par Follow up 4wks for re-evaluation.

## 2021-06-28 NOTE — ADDENDUM
[FreeTextEntry1] : Documented by Dani Kumar acting as a scribe for Dr. Kushal Burkett  on 06/28/2021. All medical record entries made by the Scribe were at my, Dr. Kushal Burkett, direction and personally dictated by me on 06/28/2021 . I have reviewed the chart and agree that the record accurately reflects my personal performance of the history, physical exam, assessment and plan. I have also personally directed, reviewed, and agreed with the chart.

## 2021-06-30 NOTE — ASU PREOP CHECKLIST - ADVANCE DIRECTIVE ADDRESSED/READDRESSED
Procedure(s):  REMOVE & REPLACE ICD BIV MULTI LEADS.    total IV anesthesia    Anesthesia Post Evaluation      Multimodal analgesia: multimodal analgesia used between 6 hours prior to anesthesia start to PACU discharge  Patient location during evaluation: bedside  Patient participation: complete - patient participated  Level of consciousness: awake and responsive to light touch  Pain management: adequate  Airway patency: patent  Anesthetic complications: no  Cardiovascular status: acceptable, hemodynamically stable, blood pressure returned to baseline and stable  Respiratory status: acceptable, unassisted, spontaneous ventilation and nonlabored ventilation  Hydration status: acceptable        INITIAL Post-op Vital signs:   Vitals Value Taken Time   /90 06/30/21 1211   Temp 36.3 °C (97.4 °F) 06/30/21 1048   Pulse 70 06/30/21 1217   Resp 20 06/30/21 1048   SpO2 89 % 06/30/21 1217   Vitals shown include unvalidated device data. done

## 2021-07-26 ENCOUNTER — APPOINTMENT (OUTPATIENT)
Dept: ORTHOPEDIC SURGERY | Facility: CLINIC | Age: 66
End: 2021-07-26
Payer: MEDICARE

## 2021-07-26 PROCEDURE — 99024 POSTOP FOLLOW-UP VISIT: CPT

## 2021-07-26 NOTE — HISTORY OF PRESENT ILLNESS
[0] : no pain reported [Clean/Dry/Intact] : clean, dry and intact [Healed] : healed [Neuro Intact] : an unremarkable neurological exam [Vascular Intact] : ~T peripheral vascular exam normal [Chills] : no chills [Fever] : no fever [Nausea] : no nausea [Vomiting] : no vomiting [Erythema] : not erythematous [Discharge] : absent of discharge [Swelling] : not swollen [Dehiscence] : not dehisced [de-identified] : 65 y/o male status post right shoulder SAD, RTCR, calcium excision 6/16/21.  [de-identified] : 67 y/o male status post right shoulder SAD, RTCR, calcium excision. He is doing extremely well. Attending PT 2 x per week. Denies post op complications.  [de-identified] : Right shoulder exam:\par \par Inspection: No swelling\par Skin: Incisions C/D/I, no drainage, healed\par ROM: 170 FF, 90 abd, 30 ER, IR to upper lumbar\par Painful arc ROM: not tested\par Tenderness: Tenderness throughout the shoulder girdle\par Strength: 5/5 IR/ER/FF\par Vasc: 2+ radial pulse \par Neuro: AIN, PIN, Ulnar nerve intact to motor\par Sensation: Intact to light touch throughout  [de-identified] : 67 y/o male status post right shoulder SAD, RTCR, calcium excision. \par \par Patient is doing well at this time following surgical intervention. Denies any immediate postoperative complications. Progressing well with physical therapy. Discussion was had with the patient regarding the next phase of physical therapy as instructed. \par \par Recommendations:\par 1. Continue PT treatment as per protocol (Updated Rx given). HEP as instructed.\par 2. Brace: Discontinue brace.\par 3. Meds: Tylenol/NSAID's as tolerated.\par 4. Ice as needed after activity/HEP\par 5. Restrictions: None\par \par Followup 6 weeks for repeat clinical evaluation.

## 2021-07-26 NOTE — ADDENDUM
[FreeTextEntry1] : This note was written by Fiona Doe on 07/26/2021 acting solely as a scribe for Dr. Kushal Burkett.\par \par All medical record entries made by the Scribe were at my, Dr. Kushal Burkett, direction and personally dictated by me on 07/26/2021. I have personally reviewed the chart and agree that the record accurately reflects my personal performance of the history, physical exam, assessment and plan.

## 2021-09-27 ENCOUNTER — APPOINTMENT (OUTPATIENT)
Dept: ORTHOPEDIC SURGERY | Facility: CLINIC | Age: 66
End: 2021-09-27
Payer: MEDICARE

## 2021-09-27 VITALS
BODY MASS INDEX: 30.13 KG/M2 | WEIGHT: 192 LBS | SYSTOLIC BLOOD PRESSURE: 152 MMHG | HEIGHT: 67 IN | DIASTOLIC BLOOD PRESSURE: 78 MMHG | HEART RATE: 70 BPM

## 2021-09-27 DIAGNOSIS — M75.31 CALCIFIC TENDINITIS OF RIGHT SHOULDER: ICD-10-CM

## 2021-09-27 PROCEDURE — 99213 OFFICE O/P EST LOW 20 MIN: CPT

## 2021-09-28 PROBLEM — M75.31 CALCIFIC TENDONITIS OF RIGHT SHOULDER: Status: ACTIVE | Noted: 2020-09-17

## 2021-09-28 NOTE — PHYSICAL EXAM
[de-identified] : Oriented to time, place, person\par Mood: Normal\par Affect: Normal\par Appearance: Healthy, well appearing, no acute distress.\par Gait: Normal\par Assistive Devices: None \par \par Right shoulder exam:\par \par Inspection: No swelling\par Skin: Incisions C/D/I, no drainage, healed\par ROM: 170 FF, 90 abd, 50 ER, IR to mid lumbar\par Painful arc ROM: none\par Tenderness: none, Minimal Al\par Strength: 5/5 IR/ER/FF\par Vasc: 2+ radial pulse \par Neuro: AIN, PIN, Ulnar nerve intact to motor\par Sensation: Intact to light touch throughout.

## 2021-09-28 NOTE — DISCUSSION/SUMMARY
[de-identified] : 67 y/o male status post right shoulder SAD, RTCR, calcium excision. \par \par Patient is doing well at this time following surgical intervention. Denies any immediate postoperative complications. He has completed PT last week.  Denies any significant concerns or complaints at this time and the patient is very happy with his postoperative results. \par \par Recommendations:\par 1. HEP for terminal ROM exercises / strengthening and conditioning.\par 2. Meds: Tylenol/NSAID's as tolerated.\par 3. Ice PRN\par 4. Return to ADL's, light activity as instructed.\par \par Followup 3 months.

## 2021-09-28 NOTE — HISTORY OF PRESENT ILLNESS
[0] : no pain reported [Clean/Dry/Intact] : clean, dry and intact [Healed] : healed [Neuro Intact] : an unremarkable neurological exam [Vascular Intact] : ~T peripheral vascular exam normal [Chills] : no chills [Fever] : no fever [Nausea] : no nausea [Vomiting] : no vomiting [Erythema] : not erythematous [Discharge] : absent of discharge [Swelling] : not swollen [Dehiscence] : not dehisced [de-identified] : 67 y/o male status post right shoulder SAD, RTCR, calcium excision 6/16/21.  [de-identified] : Right shoulder exam:\par \par Inspection: No swelling\par Skin: Incisions C/D/I, no drainage, healed\par ROM: 170 FF, 90 abd, 30 ER, IR to upper lumbar\par Painful arc ROM: not tested\par Tenderness: Tenderness throughout the shoulder girdle\par Strength: 5/5 IR/ER/FF\par Vasc: 2+ radial pulse \par Neuro: AIN, PIN, Ulnar nerve intact to motor\par Sensation: Intact to light touch throughout  [de-identified] : 67 y/o male status post right shoulder SAD, RTCR, calcium excision. \par \par Patient is doing well at this time following surgical intervention. Denies any immediate postoperative complications. Progressing well with physical therapy. Discussion was had with the patient regarding the next phase of physical therapy as instructed. \par \par Recommendations:\par 1. Continue PT treatment as per protocol (Updated Rx given). HEP as instructed.\par 2. Brace: Discontinue brace.\par 3. Meds: Tylenol/NSAID's as tolerated.\par 4. Ice as needed after activity/HEP\par 5. Restrictions: None\par \par Followup 6 weeks for repeat clinical evaluation.  [de-identified] : 65 y/o male status post right shoulder SAD, RTCR, calcium excision. He is doing extremely well. Completed PT last week. Reports slight pain at end range. He is very happy with post surgical results. Denies post op complications. No pain reported.

## 2021-09-28 NOTE — ADDENDUM
[FreeTextEntry1] : This note was written by Fiona Doe on 09/27/2021 acting solely as a scribe for Dr. Kushal Burkett.\par \par All medical record entries made by the Scribe were at my, Dr. Kushal Burkett, direction and personally dictated by me on 09/27/2021. I have personally reviewed the chart and agree that the record accurately reflects my personal performance of the history, physical exam, assessment and plan.

## 2021-09-28 NOTE — DISCUSSION/SUMMARY
[de-identified] : 65 y/o male status post right shoulder SAD, RTCR, calcium excision. \par \par Patient is doing well at this time following surgical intervention. Denies any immediate postoperative complications. He has completed PT last week.  Denies any significant concerns or complaints at this time and the patient is very happy with his postoperative results. \par \par Recommendations:\par 1. HEP for terminal ROM exercises / strengthening and conditioning.\par 2. Meds: Tylenol/NSAID's as tolerated.\par 3. Ice PRN\par 4. Return to ADL's, light activity as instructed.\par \par Followup 3 months.

## 2021-09-28 NOTE — HISTORY OF PRESENT ILLNESS
[0] : no pain reported [Clean/Dry/Intact] : clean, dry and intact [Healed] : healed [Neuro Intact] : an unremarkable neurological exam [Vascular Intact] : ~T peripheral vascular exam normal [Chills] : no chills [Fever] : no fever [Nausea] : no nausea [Vomiting] : no vomiting [Erythema] : not erythematous [Discharge] : absent of discharge [Swelling] : not swollen [Dehiscence] : not dehisced [de-identified] : 65 y/o male status post right shoulder SAD, RTCR, calcium excision 6/16/21.  [de-identified] : Right shoulder exam:\par \par Inspection: No swelling\par Skin: Incisions C/D/I, no drainage, healed\par ROM: 170 FF, 90 abd, 30 ER, IR to upper lumbar\par Painful arc ROM: not tested\par Tenderness: Tenderness throughout the shoulder girdle\par Strength: 5/5 IR/ER/FF\par Vasc: 2+ radial pulse \par Neuro: AIN, PIN, Ulnar nerve intact to motor\par Sensation: Intact to light touch throughout  [de-identified] : 67 y/o male status post right shoulder SAD, RTCR, calcium excision. \par \par Patient is doing well at this time following surgical intervention. Denies any immediate postoperative complications. Progressing well with physical therapy. Discussion was had with the patient regarding the next phase of physical therapy as instructed. \par \par Recommendations:\par 1. Continue PT treatment as per protocol (Updated Rx given). HEP as instructed.\par 2. Brace: Discontinue brace.\par 3. Meds: Tylenol/NSAID's as tolerated.\par 4. Ice as needed after activity/HEP\par 5. Restrictions: None\par \par Followup 6 weeks for repeat clinical evaluation.  [de-identified] : 65 y/o male status post right shoulder SAD, RTCR, calcium excision. He is doing extremely well. Completed PT last week. Reports slight pain at end range. He is very happy with post surgical results. Denies post op complications. No pain reported.

## 2022-08-05 ENCOUNTER — APPOINTMENT (OUTPATIENT)
Dept: ORTHOPEDIC SURGERY | Facility: CLINIC | Age: 67
End: 2022-08-05

## 2022-08-05 PROCEDURE — 99214 OFFICE O/P EST MOD 30 MIN: CPT

## 2022-08-05 PROCEDURE — 72100 X-RAY EXAM L-S SPINE 2/3 VWS: CPT

## 2022-08-05 NOTE — HISTORY OF PRESENT ILLNESS
[de-identified] : Patient is here for LBP that began about a week and a half ago. There was no inciting injury. He feels the pain go towards his left hip and mid back. He has used Ibuprofen to treat it. He has experienced LBP in the past but states that it would resolve quickly on its own. Denies N/T/bowel or bladder dysfunction/saddle anesthesia. \par

## 2022-08-05 NOTE — DISCUSSION/SUMMARY
[de-identified] : Discussed findings of today's exam and possible causes of patient's pain.  Educated patient on their most probable diagnosis of chronic intermittent low back pain with recent atraumatic exacerbation and now daily persistence of nonradicular low back pain due to underlying thoracolumbar osteoarthritis and resultant lumbar spasm.  Reviewed possible courses of treatment, and we collaboratively decided best course of treatment at this time will include conservative management.  Patient started on a course of oral NSAIDs, prescription given for diclofenac (We discussed all possible side effects of this medication).  Patient will be started on a course of physical therapy to restore normal range of motion and strength as tolerated.  Follow up as needed.  Patient appreciates and agrees with current plan.\par \par I work as part of an academic orthopedic group and routinely have a physician in training (resident / fellow) working with me.  Any part of the history and physical exam performed by the physician in training was either directly reviewed and/or replicated by myself.  Any procedure performed by the physician in training was performed under my direct supervision and with the consent of the patient.\par \par This note was generated using dragon medical dictation software.  A reasonable effort has been made for proofreading its contents, but typos may still remain.  If there are any questions or points of clarification needed please notify my office.\par

## 2022-08-05 NOTE — PHYSICAL EXAM
[de-identified] : Constitutional: Well-nourished, well-developed, No acute distress\par Respiratory:  Good respiratory effort, no SOB\par Lymphatic: No regional lymphadenopathy, no lymphedema\par Psychiatric: Pleasant and normal affect, alert and oriented x3\par Musculoskeletal: normal except where as noted in regional exam\par \par Thoracic Spine Exam:\par \par normal curvature and normal alignment. good posture. no midline bony tenderness, + marked spasm and associated tenderness of bilateral paraspinal and periscapular muscles.  ROM mildly limited in sidebending and rotation due to noted spasm\par \par Lumbar Spine Exam\par \par APPEARANCE: no marked deformities or malalignment, normal curvature of the lumbosacral spine. good posture\par POSITIVE TENDERNESS: + Bilateral lumbar tenderness and spasm noted in erector spinae and quadratus lumborum\par NONTENDER: no bony midline tenderness\par ROM: full, although painful at end range of flexion and extension\par RESISTIVE TESTING: mildly painful 4/5 resisted flex/ext, sidebending b/l, and rotation\par SPECIAL TESTS: neg SLR b/l, neg NEHA b/l, neg Trendelenburg b/l \par PULSES: 2+ DP/PT pulses\par NEURO:  L1 - S2 intact to sensation and motor, DTRs 2+/4 patella and achilles\par  [de-identified] : The following radiographs were ordered and read by me during this patient's visit. I reviewed each radiograph in detail with the patient and discussed the findings as highlighted below. \par \par 2 views of the lumbar spine were obtained today that show degenerative changes and evidence of moderate osteoarthritis in the T11-L1 levels, mild osteoarthritis L3-S1.  No fracture, or dislocation seen at this time. There is no malalignment. No other obvious osseous abnormality. Otherwise unremarkable.

## 2022-09-07 ENCOUNTER — RX RENEWAL (OUTPATIENT)
Age: 67
End: 2022-09-07

## 2022-09-07 RX ORDER — DICLOFENAC SODIUM 50 MG/1
50 TABLET, DELAYED RELEASE ORAL
Qty: 60 | Refills: 0 | Status: ACTIVE | COMMUNITY
Start: 2022-08-05 | End: 1900-01-01

## 2022-09-08 ENCOUNTER — APPOINTMENT (OUTPATIENT)
Dept: ORTHOPEDIC SURGERY | Facility: CLINIC | Age: 67
End: 2022-09-08

## 2022-09-08 VITALS
HEIGHT: 67 IN | BODY MASS INDEX: 29.82 KG/M2 | HEART RATE: 75 BPM | SYSTOLIC BLOOD PRESSURE: 155 MMHG | DIASTOLIC BLOOD PRESSURE: 78 MMHG | WEIGHT: 190 LBS

## 2022-09-08 DIAGNOSIS — M60.9 MYOSITIS, UNSPECIFIED: ICD-10-CM

## 2022-09-08 PROCEDURE — 99214 OFFICE O/P EST MOD 30 MIN: CPT | Mod: 25

## 2022-09-08 PROCEDURE — 20553 NJX 1/MLT TRIGGER POINTS 3/>: CPT | Mod: LT

## 2022-09-08 NOTE — HISTORY OF PRESENT ILLNESS
[de-identified] : Patient is here for LBP follow up. Patient has had 9 appointments of PT and was taking Diclofenac 2x/day. Patient was having improvement in pain, but when he finished diclofenac prescription, the pain returned. Pain is mostly worse in the morning, in the LowerBack and left hip. No radiation to the lower extremities, but some radiation towards the mid back.

## 2022-09-08 NOTE — PHYSICAL EXAM
[de-identified] : Constitutional: Well-nourished, well-developed, No acute distress\par Respiratory:  Good respiratory effort, no SOB\par Lymphatic: No regional lymphadenopathy, no lymphedema\par Psychiatric: Pleasant and normal affect, alert and oriented x3\par Musculoskeletal: normal except where as noted in regional exam\par \par Thoracic Spine Exam:\par \par normal curvature and normal alignment. good posture. no midline bony tenderness, + marked spasm and associated tenderness of bilateral paraspinal and periscapular muscles.  ROM mildly limited in sidebending and rotation due to noted spasm\par \par Lumbar Spine Exam\par \par APPEARANCE: no marked deformities or malalignment, normal curvature of the lumbosacral spine. good posture\par POSITIVE TENDERNESS: + Bilateral lumbar tenderness and spasm noted in erector spinae and quadratus lumborum\par NONTENDER: no bony midline tenderness\par ROM: full, although painful at end range of flexion and extension\par RESISTIVE TESTING: mildly painful 4/5 resisted flex/ext, sidebending b/l, and rotation\par SPECIAL TESTS: neg SLR b/l, neg NEHA b/l, neg Trendelenburg b/l \par PULSES: 2+ DP/PT pulses\par NEURO:  L1 - S2 intact to sensation and motor, DTRs 2+/4 patella and achilles\par

## 2022-09-08 NOTE — DISCUSSION/SUMMARY
[de-identified] : Patient was seen today for reevaluation of persisting low back pain.  He was finding some good relief with oral medications and physical therapy, but it was always temporary, and it seems as soon as he stopped NSAIDs his pain was the same as it was at baseline.  Patient is advised that he is likely having nonradicular discogenic pain, but he does have 1 localized spot in the left lumbar area with a significant myofascial trigger point.  He is requesting some short-term pain relief for this left-sided issue.  We discussed various treatment options as well as associated risk/benefits/alternatives and patient elected to proceed with left low back myofascial trigger point cortisone injection today (see procedure note).  Informed the patient that the numbing medicine in today's injection will last for about 4-6 hours. The steroid that was injected will start to work in 1 to 2 days, peak at 1-2 weeks, and may last up to 1-2 months.  I also recommend we proceed with MRI of the lumbar spine to evaluate for extent of degenerative disc disease and patient would likely benefit from physiatry consultation thereafter to discuss risk/benefits of epidural injection if he still having pain.  Patient will follow-up once MRI results are available.  Patient appreciates and agrees with current plan.\par \par I work as part of an academic orthopedic group and routinely have a physician in training (resident / fellow) working with me.  Any part of the history and physical exam performed by the physician in training was either directly reviewed and/or replicated by myself.  Any procedure performed by the physician in training was performed under my direct supervision and with the consent of the patient.\par \par This note was generated using dragon medical dictation software.  A reasonable effort has been made for proofreading its contents, but typos may still remain.  If there are any questions or points of clarification needed please notify my office.

## 2022-09-08 NOTE — PROCEDURE
[de-identified] : Injection: Trigger Point Injection.\par Indication: Myofascial trigger point of the left quadratus lumborum, iliocostalis, longissimus, and spinalis.\par \par A discussion was had with the patient regarding this procedure and all questions were answered. All risks, benefits and alternatives were discussed. These included but were not limited to bleeding, infection, allergic reaction, and possibility of pneumothorax.  A timeout was done to ensure correct side and location of identified trigger points and pt agreed to the procedure.   Alcohol was used to clean the skin. Ethyl chloride spray was then used as a topical anesthetic. A 3cc syringe was filled with 2cc of 1% lidocaine without epi and 1cc of 40mg/ml methylprednisolone.  A 25-gauge 1.5" needle was used to inject 3 cc into each trigger point . A sterile bandage was then applied. The patient tolerated the procedure well and there were no complications.

## 2022-09-16 ENCOUNTER — NON-APPOINTMENT (OUTPATIENT)
Age: 67
End: 2022-09-16

## 2022-09-23 ENCOUNTER — APPOINTMENT (OUTPATIENT)
Dept: PHYSICAL MEDICINE AND REHAB | Facility: CLINIC | Age: 67
End: 2022-09-23

## 2022-09-30 ENCOUNTER — APPOINTMENT (OUTPATIENT)
Dept: PHYSICAL MEDICINE AND REHAB | Facility: CLINIC | Age: 67
End: 2022-09-30

## 2022-09-30 VITALS
DIASTOLIC BLOOD PRESSURE: 80 MMHG | HEART RATE: 65 BPM | SYSTOLIC BLOOD PRESSURE: 189 MMHG | TEMPERATURE: 96.8 F | OXYGEN SATURATION: 97 %

## 2022-09-30 PROCEDURE — 99204 OFFICE O/P NEW MOD 45 MIN: CPT

## 2022-09-30 RX ORDER — DOXAZOSIN 4 MG/1
4 TABLET ORAL
Refills: 0 | Status: ACTIVE | COMMUNITY

## 2022-09-30 RX ORDER — METOPROLOL SUCCINATE 50 MG/1
50 TABLET, EXTENDED RELEASE ORAL
Refills: 0 | Status: ACTIVE | COMMUNITY

## 2022-09-30 RX ORDER — CHLORTHALIDONE 25 MG/1
25 TABLET ORAL
Refills: 0 | Status: ACTIVE | COMMUNITY

## 2022-09-30 RX ORDER — OXYCODONE AND ACETAMINOPHEN 5; 325 MG/1; MG/1
5-325 TABLET ORAL
Qty: 30 | Refills: 0 | Status: DISCONTINUED | COMMUNITY
Start: 2021-06-15 | End: 2022-09-30

## 2022-09-30 RX ORDER — CHLORTHALIDONE 50 MG/1
TABLET ORAL
Refills: 0 | Status: DISCONTINUED | COMMUNITY
End: 2022-09-30

## 2022-09-30 RX ORDER — SIMVASTATIN 40 MG/1
40 TABLET, FILM COATED ORAL
Refills: 0 | Status: ACTIVE | COMMUNITY

## 2022-09-30 RX ORDER — VALSARTAN 80 MG/1
80 TABLET, COATED ORAL
Refills: 0 | Status: ACTIVE | COMMUNITY

## 2022-09-30 NOTE — CONSULT LETTER
[Dear  ___] : Dear ~SEGUN, [Consult Letter:] : I had the pleasure of evaluating your patient, [unfilled]. [Please see my note below.] : Please see my note below. [Consult Closing:] : Thank you very much for allowing me to participate in the care of this patient.  If you have any questions, please do not hesitate to contact me. [Sincerely,] : Sincerely, [FreeTextEntry2] : Félix Zimmerman DO\par [address]\[place] [address] [FreeTextEntry3] : Yar[place]za

## 2022-09-30 NOTE — ASSESSMENT
[FreeTextEntry1] : 67 year old male with low back pain and lumbar spinal stenosis.  Given the nature of the patient's complaints and lack of significant improvement following more conservative measures, we did discuss lumbar epidural steroid injection.  Risks, benefits, and expectations of the procedure were reviewed.  The patient was provided with an educational pamphlet outlining the details of the procedure so that he/she may have the ability to review the information prior to proceeding.  The patient has agreed to proceed and will follow up with me for the procedure.\par

## 2022-09-30 NOTE — HISTORY OF PRESENT ILLNESS
[Back] : back [___ mths] : [unfilled] month(s) ago [7] : a maximum pain level of 7/10 [Sharp] : sharp [Dull] : dull [Left] : left [Buttocks] : buttocks [Bending] : bending [Insomnia] : insomnia [PT] : PT [Medications] : medications [FreeTextEntry4] : standing up and stretching, laying on the left side [FreeTextEntry6] : Diclofenac, Advil

## 2022-09-30 NOTE — DATA REVIEWED
[MRI] : MRI [FreeTextEntry1] : MRI lumbar spine done 9/15/22 showed evidence of DDD at L4-L5 with bilateral facet arthropathy and moderate canal and mild bilateral foraminal stenosis, L5-S1 DDD and slight bilateral facet hypertrophy

## 2022-10-04 LAB — SARS-COV-2 N GENE NPH QL NAA+PROBE: NOT DETECTED

## 2022-10-06 ENCOUNTER — OUTPATIENT (OUTPATIENT)
Dept: OUTPATIENT SERVICES | Facility: HOSPITAL | Age: 67
LOS: 1 days | End: 2022-10-06
Payer: MEDICARE

## 2022-10-06 ENCOUNTER — APPOINTMENT (OUTPATIENT)
Dept: PHYSICAL MEDICINE AND REHAB | Facility: CLINIC | Age: 67
End: 2022-10-06

## 2022-10-06 DIAGNOSIS — Z98.890 OTHER SPECIFIED POSTPROCEDURAL STATES: Chronic | ICD-10-CM

## 2022-10-06 DIAGNOSIS — M54.16 RADICULOPATHY, LUMBAR REGION: ICD-10-CM

## 2022-10-06 PROCEDURE — 62323 NJX INTERLAMINAR LMBR/SAC: CPT

## 2022-10-21 ENCOUNTER — APPOINTMENT (OUTPATIENT)
Dept: PHYSICAL MEDICINE AND REHAB | Facility: CLINIC | Age: 67
End: 2022-10-21

## 2022-10-21 VITALS
HEART RATE: 67 BPM | OXYGEN SATURATION: 96 % | TEMPERATURE: 97.3 F | DIASTOLIC BLOOD PRESSURE: 72 MMHG | SYSTOLIC BLOOD PRESSURE: 136 MMHG

## 2022-10-21 PROCEDURE — 99213 OFFICE O/P EST LOW 20 MIN: CPT

## 2022-10-21 NOTE — ASSESSMENT
[FreeTextEntry1] : 67 year old male with low back and lumbar radicular pain now significantly improved following his first LESI.  He will continue with his home exercises and return to see me at the earliest sign that his pain returns for further injection therapy as necessary.

## 2022-10-21 NOTE — HISTORY OF PRESENT ILLNESS
[FreeTextEntry1] : Patient returns after the first LESI a few weeks ago.  The patient reports 80-90% improvement in the symptoms.  The patient is pleased with the results and returns for a follow up visit today.\par

## 2023-06-08 NOTE — PHYSICAL EXAM
[de-identified] : Oriented to time, place, person\par Mood: Normal\par Affect: Normal\par Appearance: Healthy, well appearing, no acute distress.\par Gait: Normal\par Assistive Devices: None \par \par Right shoulder exam:\par \par Inspection: No swelling\par Skin: Incisions C/D/I, no drainage, healed\par ROM: 170 FF, 90 abd, 50 ER, IR to mid lumbar\par Painful arc ROM: none\par Tenderness: none, Minimal Al\par Strength: 5/5 IR/ER/FF\par Vasc: 2+ radial pulse \par Neuro: AIN, PIN, Ulnar nerve intact to motor\par Sensation: Intact to light touch throughout.  OB/GYN

## 2024-03-02 NOTE — ASU DISCHARGE PLAN (ADULT/PEDIATRIC) - DIET/FLUID RESTRICTION
Prep Survey      Flowsheet Row Responses   [facility] patient discharged from? [facility]   Is LACE score < 7 ? No   Eligibility [facility]   Date of Admission 03/01/24   Date of Discharge 03/02/24   Discharge Disposition Home or Self Care   Discharge diagnosis Alcohol abuse   Does the patient have one of the following disease processes/diagnoses(primary or secondary)? Other   Does the patient have Home health ordered? No   Is there a DME ordered? No   Medication alerts for this patient [facility]   Prep survey completed? Yes            Teri GORDON - Registered Nurse           Progress slowly/Increase fluids

## 2024-03-20 ENCOUNTER — APPOINTMENT (OUTPATIENT)
Dept: PHYSICAL MEDICINE AND REHAB | Facility: CLINIC | Age: 69
End: 2024-03-20
Payer: MEDICARE

## 2024-03-20 PROCEDURE — 99214 OFFICE O/P EST MOD 30 MIN: CPT

## 2024-03-20 NOTE — HISTORY OF PRESENT ILLNESS
[FreeTextEntry1] : Patient returns after his last injection in October of 2022.  He states his pain has returned over the past 3 months.  He is here to discuss a repeat injection.

## 2024-03-20 NOTE — ASSESSMENT
[FreeTextEntry1] : 68 year old male with low back and lumbar radicular pain now returned.  Risks, benefits, and expectations of the procedure were reviewed.  The patient was provided with an educational pamphlet outlining the details of the procedure so that he/she may have the ability to review the information prior to proceeding.  The patient has agreed to proceed and will follow up with me for the procedure.

## 2024-04-08 ENCOUNTER — APPOINTMENT (OUTPATIENT)
Dept: PHYSICAL MEDICINE AND REHAB | Facility: CLINIC | Age: 69
End: 2024-04-08
Payer: MEDICARE

## 2024-04-08 ENCOUNTER — OUTPATIENT (OUTPATIENT)
Dept: OUTPATIENT SERVICES | Facility: HOSPITAL | Age: 69
LOS: 1 days | End: 2024-04-08
Payer: MEDICARE

## 2024-04-08 DIAGNOSIS — Z98.890 OTHER SPECIFIED POSTPROCEDURAL STATES: Chronic | ICD-10-CM

## 2024-04-08 DIAGNOSIS — M54.16 RADICULOPATHY, LUMBAR REGION: ICD-10-CM

## 2024-04-08 DIAGNOSIS — M62.830 MUSCLE SPASM OF BACK: ICD-10-CM

## 2024-04-08 PROCEDURE — 62323 NJX INTERLAMINAR LMBR/SAC: CPT

## 2024-04-08 NOTE — REASON FOR VISIT
no lesions,  no deformities,  no traumatic injuries,  no significant scars are present,  chest wall non-tender,  no masses present, breathing is unlabored without accessory muscle use,normal breath sounds [FreeTextEntry2] : Left L4-L5 LESI

## 2024-04-08 NOTE — PROCEDURE
[de-identified] : [facility] [address], [place], [address] - [phone]  PATIENT: GABI TERAN  MEDICAL RECORD #:  DATE OF OPERATION: 04/08/2024  PREOPERATIVE DIAGNOSIS:  LUMBAR RADICULAR PAIN POSTOPERATIVE DIAGNOSIS:  LUMBAR RADICULAR PAIN PROCEDURE: LUMBAR EPIDURAL STEROID INJECTION UNDER X-RAY GUIDANCE SURGEON:  JESÚS DELGADILLO M.D. ANESTHEISA:  LOCAL EBL:  MINIMAL  INDICATIONS:  The patient returns to Pain Management with persistent lower back pain with radiation down the left leg.  The pain has remained quite significant and interferes with the patients ability to perform ADLs despite the implementation of other conservative measures.  I discussed with the patient at length the risks, benefits, and expectations of the aforementioned procedure.  All of the patients questions were answered.  The patient signed informed consent and agreed to proceed.  PROCEDURE:  The patient was transported to the operating room, placed in the prone position and monitored non-invasively with stable vital signs and no complaints.  The patients back was prepped three times with betadine and draped in meticulous sterile fashion.  The L4-L5 interspace was identified fluoroscopically and the skin overlying this level was infiltrated with 5cc of 1% preservative-free lidocaine using a 25 gauge one inch needle.  The epidural space was approached and entered at this level with a 20 gauge Tuohy needle by loss of resistance technique.  Following loss of resistance to air, aspiration was negative for CSF or heme.  Then, 2cc of Omnipaque 240 were injected and the epidurogram revealed spread from L4 to S1 in the posterior epidural space bilaterally with left-sided predominance and no distinct cutoff.  Depo-Medrol 80mg was then injected with 1cc of preservative-free 1% lidocaine.  The needle tract was flushed with 2cc of 1% lidocaine and the needle was removed.  A sterile bandage was applied.  The patient tolerated the procedure well without complaint or complication.  The patient was observed in stable condition after the procedure for approximately 30 minutes before being discharged to home in the company of an adult.  The patient was provided with full written instructions.  The patient will be seen for follow-up in my office in 1 week but certainly sooner with any questions or concerns.    Jesús Delgadillo M.D.

## 2024-05-01 ENCOUNTER — APPOINTMENT (OUTPATIENT)
Dept: PHYSICAL MEDICINE AND REHAB | Facility: CLINIC | Age: 69
End: 2024-05-01
Payer: MEDICARE

## 2024-05-01 DIAGNOSIS — M51.36 OTHER INTERVERTEBRAL DISC DEGENERATION, LUMBAR REGION: ICD-10-CM

## 2024-05-01 DIAGNOSIS — M48.061 SPINAL STENOSIS, LUMBAR REGION WITHOUT NEUROGENIC CLAUDICATION: ICD-10-CM

## 2024-05-01 DIAGNOSIS — M54.50 LOW BACK PAIN, UNSPECIFIED: ICD-10-CM

## 2024-05-01 DIAGNOSIS — M47.816 SPONDYLOSIS W/OUT MYELOPATHY OR RADICULOPATHY, LUMBAR REGION: ICD-10-CM

## 2024-05-01 PROCEDURE — 99213 OFFICE O/P EST LOW 20 MIN: CPT

## 2024-05-01 NOTE — ASSESSMENT
[FreeTextEntry1] : 68 year old male with low back and lumbar radicular pain now moderately improved.  He will continue with a course of medically supervised PT.  He will return to see me at the earliest sign that his pain returns for further injection therapy as necessary.  He will also see Dr. Zimmerman about his left hip which may be a GT bursitis.

## 2024-05-01 NOTE — HISTORY OF PRESENT ILLNESS
[FreeTextEntry1] : Patient returns after the first LESI a few weeks ago.  The patient reports 70% improvement in the symptoms.  The patient is pleased with the results and returns for a follow up visit today.  He is now complaining of left hip pain.

## 2024-05-10 ENCOUNTER — APPOINTMENT (OUTPATIENT)
Dept: ORTHOPEDIC SURGERY | Facility: CLINIC | Age: 69
End: 2024-05-10
Payer: MEDICARE

## 2024-05-10 VITALS — BODY MASS INDEX: 30.29 KG/M2 | HEIGHT: 67 IN | WEIGHT: 193 LBS

## 2024-05-10 DIAGNOSIS — M70.62 TROCHANTERIC BURSITIS, LEFT HIP: ICD-10-CM

## 2024-05-10 PROCEDURE — 99214 OFFICE O/P EST MOD 30 MIN: CPT | Mod: 25

## 2024-05-10 PROCEDURE — 20610 DRAIN/INJ JOINT/BURSA W/O US: CPT | Mod: LT

## 2024-05-10 NOTE — PROCEDURE
[de-identified] : Injection: Left  Hip. Indication: Trochanteric Bursitis.  A discussion was had with the patient regarding this procedure and all questions were answered. All risks, benefits and alternatives were discussed. These included but were not limited to bleeding, infection, and allergic reaction. A timeout was done to ensure correct side and patient agreed to the procedure.  A [place] yadira was created on the skin utilizing a plastic needle cap to yadira the anticipated point of entry. Alcohol was used to clean the skin, and Betadine was used to sterilize and prep the area in the lateral aspect of the hip. A timeout was done to ensure correct side and pt agreed to the procedure.  Ethyl chloride spray was then used as a topical anesthetic. A 25-gauge needle was used to inject 2cc of 0.25% bupivacaine and 1cc of 40mg/ml methylprednisolone into the greater trochanteric bursa using a needling technique. A sterile bandage was then applied. The patient tolerated the procedure well and there were no complications

## 2024-05-10 NOTE — HISTORY OF PRESENT ILLNESS
[de-identified] : 68 year old male presenting with left lateral hip pain for 2 months that became worse over the last month or so after a lumbar ANILA, though he feels this is unrelated. Patient states that his lumbar symptoms have resolved with ANILA but he has severe pain over the lateral aspect of left hip.

## 2024-05-10 NOTE — DISCUSSION/SUMMARY
[de-identified] : Discussed findings of today's exam and possible causes of patient's pain.  Educated patient on their most probable diagnosis of chronic intermittent left hip pain with recent atraumatic exacerbation due to trochanteric bursitis and peritrochanteric tendinitis.  Reviewed possible courses of treatment, and we collaboratively decided best course of treatment at this time will include conservative management.  Patient is already tried oral NSAIDs as well as topical lidocaine patch without pain relief.  We discussed various treatment options as well as associated risk/benefits/alternatives and patient elected to proceed with cortisone injection today (see procedure note).  Informed the patient that the numbing medicine in today's injection will last for about 4-6 hours. The steroid that was injected will start to work in 1 to 2 days, peak at 1-2 weeks, and may last up to 1-2 months.  Patient will be started on a course of physical therapy to restore normal range of motion and strength as tolerated.  Follow up as needed.  Patient appreciates and agrees with current plan.  This note was generated using dragon medical dictation software.  A reasonable effort has been made for proofreading its contents, but typos may still remain.  If there are any questions or points of clarification needed please notify my office.

## 2024-05-10 NOTE — PHYSICAL EXAM
[de-identified] : Constitutional: Well-nourished, well-developed, No acute distress Respiratory:  Good respiratory effort, no SOB Psychiatric: Pleasant and normal affect, alert and oriented x3 Musculoskeletal: normal except where as noted in regional exam   Left Hip:   APPEARANCE: no marked deformities, no swelling or malalignment POSITIVE TENDERNESS: Greater trochanter, and mild distally along ITB NONTENDER: TFL, gluteal region, ischium/proximal hamstring region, hip flexor region, sartorius and pubic symphysis. ROM: full & painless. RESISTIVE TESTING: Mild pain in lateral hip with resisted abduction, painless resisted ER/IR/SLR/adduction. SPECIAL TESTS: neg NEHA/FADIR, painless loaded flexion & scouring